# Patient Record
Sex: FEMALE | Race: BLACK OR AFRICAN AMERICAN | NOT HISPANIC OR LATINO | Employment: FULL TIME | URBAN - METROPOLITAN AREA
[De-identification: names, ages, dates, MRNs, and addresses within clinical notes are randomized per-mention and may not be internally consistent; named-entity substitution may affect disease eponyms.]

---

## 2019-01-17 ENCOUNTER — TRANSCRIBE ORDERS (OUTPATIENT)
Dept: REGISTRATION | Facility: HOSPITAL | Age: 34
End: 2019-01-17

## 2019-01-17 ENCOUNTER — HOSPITAL ENCOUNTER (OUTPATIENT)
Dept: RADIOLOGY | Facility: HOSPITAL | Age: 34
Discharge: HOME | End: 2019-01-17
Attending: OBSTETRICS & GYNECOLOGY | Admitting: RADIOLOGY
Payer: COMMERCIAL

## 2019-01-17 VITALS
RESPIRATION RATE: 18 BRPM | DIASTOLIC BLOOD PRESSURE: 69 MMHG | OXYGEN SATURATION: 100 % | SYSTOLIC BLOOD PRESSURE: 154 MMHG | HEART RATE: 92 BPM

## 2019-01-17 DIAGNOSIS — N97.8 ANTISPERM ANTIBODIES CAUSING INFERTILITY: ICD-10-CM

## 2019-01-17 PROCEDURE — 27200000 HC STERILE SUPPLY

## 2019-01-17 PROCEDURE — BU121ZZ FLUOROSCOPY OF BILATERAL FALLOPIAN TUBES USING LOW OSMOLAR CONTRAST: ICD-10-PCS | Performed by: RADIOLOGY

## 2019-01-17 PROCEDURE — 63600105 HC IODINE BASED CONTRAST: Performed by: OBSTETRICS & GYNECOLOGY

## 2019-01-17 PROCEDURE — 36100330 HC PROCEDURE ROOM 30MIN

## 2019-01-17 PROCEDURE — 74740 X-RAY FEMALE GENITAL TRACT: CPT

## 2019-01-17 RX ORDER — DULOXETIN HYDROCHLORIDE 60 MG/1
60 CAPSULE, DELAYED RELEASE ORAL DAILY
Status: ON HOLD | COMMUNITY
End: 2019-04-25

## 2019-01-17 RX ORDER — AZITHROMYCIN 250 MG/1
1000 TABLET, FILM COATED ORAL ONCE
Status: DISCONTINUED | OUTPATIENT
Start: 2019-01-17 | End: 2019-01-17 | Stop reason: HOSPADM

## 2019-01-17 RX ADMIN — IOHEXOL 18 ML: 300 INJECTION, SOLUTION INTRAVENOUS at 15:33

## 2019-01-17 NOTE — POST-PROCEDURE NOTE
Interventional Radiology Brief Postprocedure Note    Maria Michelle     Attending: Davian Minaya MD    Assistant: GEE Stauffer    Diagnosis: infertility    Description of procedure: hysterosalpingogram    Contrast: 18ml omni 300     Anesthesia:  none    Medications: none     Complications: None      Estimated Blood Loss: Estimated Blood Loss: None    Anticoagulation: not applicable    Specimens: none    Findings: patent left fallopean tube, proximal occlusion of right fallopean tube    1/17/2019 3:34 PM

## 2019-01-17 NOTE — DISCHARGE INSTRUCTIONS
- You may resume normal activity as tolerated  - You may resume normal diet  - Vagina spotting is common following the procedure, don't be concerned if it happens.  - If you develop heavy bleeding (like a period) please call [facility].

## 2019-04-25 ENCOUNTER — HOSPITAL ENCOUNTER (OUTPATIENT)
Dept: RADIOLOGY | Facility: HOSPITAL | Age: 34
Discharge: HOME | End: 2019-04-25
Attending: OBSTETRICS & GYNECOLOGY | Admitting: RADIOLOGY
Payer: COMMERCIAL

## 2019-04-25 ENCOUNTER — TRANSCRIBE ORDERS (OUTPATIENT)
Dept: REGISTRATION | Facility: HOSPITAL | Age: 34
End: 2019-04-25

## 2019-04-25 VITALS
DIASTOLIC BLOOD PRESSURE: 94 MMHG | RESPIRATION RATE: 20 BRPM | OXYGEN SATURATION: 99 % | SYSTOLIC BLOOD PRESSURE: 153 MMHG | HEART RATE: 92 BPM

## 2019-04-25 DIAGNOSIS — Z31.41 ENCOUNTER FOR FERTILITY TESTING: ICD-10-CM

## 2019-04-25 DIAGNOSIS — N97.8 ANTISPERM ANTIBODIES CAUSING INFERTILITY: ICD-10-CM

## 2019-04-25 PROCEDURE — 63600105 HC IODINE BASED CONTRAST: Performed by: OBSTETRICS & GYNECOLOGY

## 2019-04-25 PROCEDURE — 74740 X-RAY FEMALE GENITAL TRACT: CPT

## 2019-04-25 PROCEDURE — BU121ZZ FLUOROSCOPY OF BILATERAL FALLOPIAN TUBES USING LOW OSMOLAR CONTRAST: ICD-10-PCS | Performed by: RADIOLOGY

## 2019-04-25 RX ADMIN — IOHEXOL 45 ML: 300 INJECTION, SOLUTION INTRAVENOUS at 13:54

## 2019-04-25 NOTE — H&P
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 female who presents with infertility.     Medical History: No past medical history on file.    Surgical History: No past surgical history on file.    Allergies: Minocycline    [unfilled]    Social History:   Social History     Social History   • Marital status:      Spouse name: N/A   • Number of children: N/A   • Years of education: N/A     Social History Main Topics   • Smoking status: Not on file   • Smokeless tobacco: Not on file   • Alcohol use Not on file   • Drug use: Unknown   • Sexual activity: Not on file     Other Topics Concern   • Not on file     Social History Narrative   • No narrative on file       Family History: No family history on file.    Review of Systems  All other systems reviewed and negative except as noted in the HPI.    Objective     Vital Signs for the last 24 hours:  BP: ()/()   Arterial Line BP: ()/()       General appearance: alert, appears stated age, no distress and cooperative    Labs   I have reviewed the patient's pertinent labs. Pertinent labs are within normal limits.    Imaging  Not applicable    ECG/Telemetry  not applicable    Assessment/Plan     Continue with Hysterosalpingogram    VTE Assessment: Padua      Code Status: No Order  Estimated discharge date:       GEE Stauffer

## 2019-04-25 NOTE — DISCHARGE INSTRUCTIONS
- You may resume normal activity as tolerated  - You may resume normal diet  - Vagina spotting is common following the procedure, don't be concerned if it happens.  - If you develop heavy bleeding (like a period) please call [facility].

## 2021-06-02 ENCOUNTER — HOSPITAL ENCOUNTER (EMERGENCY)
Facility: HOSPITAL | Age: 36
Discharge: HOME | End: 2021-06-02
Attending: STUDENT IN AN ORGANIZED HEALTH CARE EDUCATION/TRAINING PROGRAM | Admitting: STUDENT IN AN ORGANIZED HEALTH CARE EDUCATION/TRAINING PROGRAM
Payer: COMMERCIAL

## 2021-06-02 VITALS
SYSTOLIC BLOOD PRESSURE: 134 MMHG | WEIGHT: 199 LBS | OXYGEN SATURATION: 98 % | BODY MASS INDEX: 35.26 KG/M2 | TEMPERATURE: 98 F | DIASTOLIC BLOOD PRESSURE: 75 MMHG | HEIGHT: 63 IN | HEART RATE: 82 BPM | RESPIRATION RATE: 18 BRPM

## 2021-06-02 DIAGNOSIS — N93.9 EPISODE OF HEAVY VAGINAL BLEEDING: Primary | ICD-10-CM

## 2021-06-02 LAB
ABO + RH BLD: NORMAL
ANION GAP SERPL CALC-SCNC: 5 MEQ/L (ref 3–15)
BASOPHILS # BLD: 0.02 K/UL (ref 0.01–0.1)
BASOPHILS NFR BLD: 0.4 %
BLD GP AB SCN SERPL QL: NEGATIVE
BUN SERPL-MCNC: 18 MG/DL (ref 8–20)
CALCIUM SERPL-MCNC: 9.2 MG/DL (ref 8.9–10.3)
CHLORIDE SERPL-SCNC: 107 MEQ/L (ref 98–109)
CO2 SERPL-SCNC: 24 MEQ/L (ref 22–32)
CREAT SERPL-MCNC: 0.9 MG/DL (ref 0.6–1.1)
D AG BLD QL: POSITIVE
DIFFERENTIAL METHOD BLD: ABNORMAL
EOSINOPHIL # BLD: 0.05 K/UL (ref 0.04–0.36)
EOSINOPHIL NFR BLD: 1 %
ERYTHROCYTE [DISTWIDTH] IN BLOOD BY AUTOMATED COUNT: 12.8 % (ref 11.7–14.4)
GFR SERPL CREATININE-BSD FRML MDRD: >60 ML/MIN/1.73M*2
GLUCOSE SERPL-MCNC: 94 MG/DL (ref 70–99)
HCG UR QL: NEGATIVE
HCT VFR BLDCO AUTO: 39.9 % (ref 35–45)
HGB BLD-MCNC: 12.9 G/DL (ref 11.8–15.7)
IMM GRANULOCYTES # BLD AUTO: 0.01 K/UL (ref 0–0.08)
IMM GRANULOCYTES NFR BLD AUTO: 0.2 %
LABORATORY COMMENT REPORT: NORMAL
LYMPHOCYTES # BLD: 1.65 K/UL (ref 1.2–3.5)
LYMPHOCYTES NFR BLD: 32.2 %
MCH RBC QN AUTO: 27.6 PG (ref 28–33.2)
MCHC RBC AUTO-ENTMCNC: 32.3 G/DL (ref 32.2–35.5)
MCV RBC AUTO: 85.3 FL (ref 83–98)
MONOCYTES # BLD: 0.29 K/UL (ref 0.28–0.8)
MONOCYTES NFR BLD: 5.7 %
NEUTROPHILS # BLD: 3.11 K/UL (ref 1.7–7)
NEUTS SEG NFR BLD: 60.5 %
NRBC BLD-RTO: 0 %
PDW BLD AUTO: 9.7 FL (ref 9.4–12.3)
PLATELET # BLD AUTO: 339 K/UL (ref 150–369)
POTASSIUM SERPL-SCNC: 3.9 MEQ/L (ref 3.6–5.1)
RBC # BLD AUTO: 4.68 M/UL (ref 3.93–5.22)
SODIUM SERPL-SCNC: 136 MEQ/L (ref 136–144)
WBC # BLD AUTO: 5.13 K/UL (ref 3.8–10.5)

## 2021-06-02 PROCEDURE — 84703 CHORIONIC GONADOTROPIN ASSAY: CPT | Performed by: PHYSICIAN ASSISTANT

## 2021-06-02 PROCEDURE — 96360 HYDRATION IV INFUSION INIT: CPT

## 2021-06-02 PROCEDURE — 85025 COMPLETE CBC W/AUTO DIFF WBC: CPT | Performed by: PHYSICIAN ASSISTANT

## 2021-06-02 PROCEDURE — 25800000 HC PHARMACY IV SOLUTIONS: Performed by: PHYSICIAN ASSISTANT

## 2021-06-02 PROCEDURE — 99284 EMERGENCY DEPT VISIT MOD MDM: CPT | Mod: 25

## 2021-06-02 PROCEDURE — 80048 BASIC METABOLIC PNL TOTAL CA: CPT | Performed by: PHYSICIAN ASSISTANT

## 2021-06-02 PROCEDURE — 36415 COLL VENOUS BLD VENIPUNCTURE: CPT | Performed by: PHYSICIAN ASSISTANT

## 2021-06-02 PROCEDURE — 3E0337Z INTRODUCTION OF ELECTROLYTIC AND WATER BALANCE SUBSTANCE INTO PERIPHERAL VEIN, PERCUTANEOUS APPROACH: ICD-10-PCS | Performed by: STUDENT IN AN ORGANIZED HEALTH CARE EDUCATION/TRAINING PROGRAM

## 2021-06-02 PROCEDURE — 86850 RBC ANTIBODY SCREEN: CPT

## 2021-06-02 RX ORDER — LEVOTHYROXINE SODIUM 50 UG/1
50 TABLET ORAL DAILY
COMMUNITY
Start: 2021-05-27

## 2021-06-02 RX ORDER — LEUPROLIDE ACETATE 1 MG/0.2ML
VIAL (ML) SUBCUTANEOUS
COMMUNITY
Start: 2021-03-17 | End: 2022-10-25 | Stop reason: ALTCHOICE

## 2021-06-02 RX ADMIN — SODIUM CHLORIDE 1000 ML: 9 INJECTION, SOLUTION INTRAVENOUS at 09:38

## 2021-06-02 ASSESSMENT — ENCOUNTER SYMPTOMS
VOMITING: 0
DIZZINESS: 0
LIGHT-HEADEDNESS: 1
NAUSEA: 0
CHILLS: 0
SHORTNESS OF BREATH: 1
WEAKNESS: 1
FEVER: 0
BACK PAIN: 0
COLOR CHANGE: 0
CHEST TIGHTNESS: 0
BRUISES/BLEEDS EASILY: 0
ABDOMINAL PAIN: 1

## 2021-06-02 NOTE — ED PROVIDER NOTES
Emergency Medicine Note  HPI   HISTORY OF PRESENT ILLNESS     36-year-old female history of anemia, hypothyroidism, PCOS presents the emergency room for evaluation of lightheadedness and vaginal bleeding.  Patient is undergoing treatment by her endocrinologist for infertility, she was on birth control pills until 5/7/2021.  She reports mild but constant vaginal bleeding that began on 5/11/2021 while on the birth control pills, these were discontinued 1 week ago per her endocrinologist and she reports worsening vaginal bleeding.  She is saturating 1 overnight sanitary pad every 3 hours, also reports generalized weakness and lightheadedness with occasional dyspnea on exertion.  She does have a history of anemia but has never required blood transfusions. She underwent endometrial biopsy 1-2 months ago. She is followed by an OB/GYN at [facility].  Denies fever, chills, abdominal pain or low back pain.      Vaginal Bleeding  Associated symptoms: abdominal pain (intermittent cramping, none currently)    Associated symptoms: no back pain, no dizziness, no fever and no nausea          Patient History   PAST HISTORY     Reviewed from Nursing Triage:      Past Medical History:   Diagnosis Date   • Anemia    • Hypothyroidism    • PCOS (polycystic ovarian syndrome)        Past Surgical History:   Procedure Laterality Date   • POLYPECTOMY         History reviewed. No pertinent family history.    Social History     Tobacco Use   • Smoking status: Never Smoker   • Smokeless tobacco: Never Used   Vaping Use   • Vaping Use: Never used   Substance Use Topics   • Alcohol use: Yes     Comment: occassionally   • Drug use: Not Currently         Review of Systems   REVIEW OF SYSTEMS     Review of Systems   Constitutional: Negative for chills and fever.   Respiratory: Positive for shortness of breath. Negative for chest tightness.    Cardiovascular: Negative for chest pain.   Gastrointestinal: Positive for abdominal pain (intermittent  cramping, none currently). Negative for nausea and vomiting.   Genitourinary: Positive for vaginal bleeding. Negative for vaginal pain.   Musculoskeletal: Negative for back pain.   Skin: Negative for color change and pallor.   Allergic/Immunologic: Negative for immunocompromised state.   Neurological: Positive for weakness and light-headedness. Negative for dizziness and syncope.   Hematological: Does not bruise/bleed easily.   All other systems reviewed and are negative.        VITALS     ED Vitals    Date/Time Temp Pulse Resp BP SpO2 [facility]   06/02/21 0902 36.7 °C (98 °F) 93 17 144/82 97 % AA        Pulse Ox %: 97 % (06/02/21 0902)  Pulse Ox Interpretation: Normal (06/02/21 0902)  Heart Rate: 93 (06/02/21 0902)  Rhythm Strip Interpretation: Normal Sinus Rhythm (06/02/21 0902)     Physical Exam   PHYSICAL EXAM     Physical Exam  Vitals and nursing note reviewed.   Constitutional:       General: She is not in acute distress.     Appearance: Normal appearance. She is not ill-appearing or toxic-appearing.   HENT:      Head: Normocephalic and atraumatic.   Cardiovascular:      Rate and Rhythm: Normal rate and regular rhythm.      Pulses: Normal pulses.      Heart sounds: Normal heart sounds.   Pulmonary:      Effort: Pulmonary effort is normal.      Breath sounds: Normal breath sounds.   Abdominal:      General: Abdomen is flat. Bowel sounds are normal.      Palpations: Abdomen is soft.      Tenderness: There is no abdominal tenderness.   Musculoskeletal:         General: Normal range of motion.   Skin:     General: Skin is warm and dry.   Neurological:      Mental Status: She is alert.           PROCEDURES     Procedures     DATA     Results     Procedure Component Value Units Date/Time    Type and screen [[id number]] Collected: 06/02/21 0915    Specimen: Blood, Venous Updated: 06/02/21 1011     ABO A     Rh Factor Positive     History Check No type on file    Basic metabolic panel [[id number]]  (Normal) Collected: 06/02/21  0915    Specimen: Blood, Venous Updated: 06/02/21 0942     Sodium 136 mEQ/L      Potassium 3.9 mEQ/L      Comment: Results obtained on plasma. Plasma Potassium values may be up to 0.4 mEQ/L less than serum values. The differences may be greater for patients with high platelet or white cell counts.        Chloride 107 mEQ/L      CO2 24 mEQ/L      BUN 18 mg/dL      Creatinine 0.9 mg/dL      Glucose 94 mg/dL      Calcium 9.2 mg/dL      eGFR >60.0 mL/min/1.73m*2      Anion Gap 5 mEQ/L     BhCG, Serum, Qual [[id number]]  (Normal) Collected: 06/02/21 0915    Specimen: Blood, Venous Updated: 06/02/21 0936     Preg Test, Serum Negative    CBC and differential [[id number]]  (Abnormal) Collected: 06/02/21 0915    Specimen: Blood, Venous Updated: 06/02/21 0926     WBC 5.13 K/uL      RBC 4.68 M/uL      Hemoglobin 12.9 g/dL      Hematocrit 39.9 %      MCV 85.3 fL      MCH 27.6 pg      MCHC 32.3 g/dL      RDW 12.8 %      Platelets 339 K/uL      MPV 9.7 fL      Differential Type Auto     nRBC 0.0 %      Immature Granulocytes 0.2 %      Neutrophils 60.5 %      Lymphocytes 32.2 %      Monocytes 5.7 %      Eosinophils 1.0 %      Basophils 0.4 %      Immature Granulocytes, Absolute 0.01 K/uL      Neutrophils, Absolute 3.11 K/uL      Lymphocytes, Absolute 1.65 K/uL      Monocytes, Absolute 0.29 K/uL      Eosinophils, Absolute 0.05 K/uL      Basophils, Absolute 0.02 K/uL           Imaging Results    None         No orders to display       Scoring tools                                 ED Course & MDM   MDM / ED COURSE and CLINICAL IMPRESSIONS     MDM    ED Course as of Jun 02 1018 Wed Jun 02, 2021   0928 Hemoglobin: 12.9 [EK]   0934 Pt seen and evaluated. Appears generally well. Reports scant vaginal bleeding since mid May but increased in amount the last few days. Had scheduled f/u apt this morning but was advised to come to ED first for blood work secondary to described amount of bleeding and symptoms. Upon exam pt appears very well and  articulate with smart phone as she continues to work in [employer]. Will await labs and if unremarkable dc home with continued f/u as scheduled. Q/C addressed. Agree with plan.     [NS]   1015 Patient's blood work is all normal, she is very well-appearing with normal vital signs and reports feeling better after IV fluids. She has not had to change her pad since she arrived in the ED. She stable for discharge home with follow-up with her endocrinologist to continue fertility treatment.  She is to return to the emergency room for any symptoms of anemia or severe vaginal bleeding.    [EK]      ED Course User Index  [EK] Lillian Bell PA C  [NS] Duglas Heard, DO         Clinical Impressions as of Jun 02 1018   Episode of heavy vaginal bleeding            Lillian Bell PA C  06/02/21 1018

## 2021-06-02 NOTE — DISCHARGE INSTRUCTIONS
Follow up with your endocrinologist within 2 days of discharge from the ED.    Drink plenty of fluids.    Return to the emergency room if you experience worsening symptoms including severe vaginal bleeding (saturating > 1 pad per hour), dizziness, shortness of breath, severe abdominal pain, fainting or any other concerning symptoms.

## 2021-06-02 NOTE — Clinical Note
Maria Michelle was seen and treated in our emergency department on 6/2/2021.  She may return to work on 06/02/2021.       If you have any questions or concerns, please don't hesitate to call.      Lillian Bell PA C

## 2021-06-02 NOTE — ED ATTESTATION NOTE
"I saw and evaluated the patient, participated in the management, and agree with the findings in the above note. We discussed the case and the treatment plan.  Exam:  Patient Vitals for the past 72 hrs:   BP Temp Temp src Pulse Resp SpO2 Height Weight   06/02/21 0902 (!) 144/82 36.7 °C (98 °F) Tympanic 93 17 97 % 1.6 m (5' 3\") 90.3 kg (199 lb)   vss, nad, nontoxic, head at/nc, normal speech, no resp distress, normal skin tone, lungs ctab, cardiac rrr without m/r/g, coordinated movement.        Duglas Heard DO  06/02/21 1004    "

## 2022-10-25 ENCOUNTER — HOSPITAL ENCOUNTER (EMERGENCY)
Facility: HOSPITAL | Age: 37
Discharge: HOME | End: 2022-10-25
Attending: EMERGENCY MEDICINE
Payer: COMMERCIAL

## 2022-10-25 VITALS
OXYGEN SATURATION: 99 % | DIASTOLIC BLOOD PRESSURE: 89 MMHG | HEART RATE: 84 BPM | RESPIRATION RATE: 18 BRPM | SYSTOLIC BLOOD PRESSURE: 131 MMHG | TEMPERATURE: 97 F

## 2022-10-25 DIAGNOSIS — N76.0 BACTERIAL VAGINOSIS: Primary | ICD-10-CM

## 2022-10-25 DIAGNOSIS — B96.89 BACTERIAL VAGINOSIS: Primary | ICD-10-CM

## 2022-10-25 LAB
B-HCG UR QL: NEGATIVE
BACTERIA URNS QL MICRO: ABNORMAL /HPF
BILIRUB UR QL STRIP.AUTO: NEGATIVE MG/DL
BV WHIFF TEST VAG QL: ABNORMAL
CLARITY UR REFRACT.AUTO: CLEAR
CLUE CELLS SPEC QL WET PREP: ABNORMAL
COLOR UR AUTO: YELLOW
GLUCOSE UR STRIP.AUTO-MCNC: NEGATIVE MG/DL
HGB UR QL STRIP.AUTO: NEGATIVE
HYALINE CASTS #/AREA URNS LPF: ABNORMAL /LPF
KETONES UR STRIP.AUTO-MCNC: NEGATIVE MG/DL
LEUKOCYTE ESTERASE UR QL STRIP.AUTO: 1
MUCOUS THREADS URNS QL MICRO: ABNORMAL /LPF
NITRITE UR QL STRIP.AUTO: NEGATIVE
PH UR STRIP.AUTO: 7 [PH]
PROT UR QL STRIP.AUTO: ABNORMAL
RBC #/AREA URNS HPF: ABNORMAL /HPF
SP GR UR REFRACT.AUTO: 1.02
SQUAMOUS URNS QL MICRO: ABNORMAL /HPF
T VAGINALIS GENITAL QL WET PREP: ABNORMAL
UROBILINOGEN UR STRIP-ACNC: 0.2 EU/DL
WBC #/AREA URNS HPF: ABNORMAL /HPF
WBC VAG QL WET PREP: ABNORMAL
YEAST VAG QL WET PREP: ABNORMAL

## 2022-10-25 PROCEDURE — 81003 URINALYSIS AUTO W/O SCOPE: CPT

## 2022-10-25 PROCEDURE — 87086 URINE CULTURE/COLONY COUNT: CPT | Performed by: EMERGENCY MEDICINE

## 2022-10-25 PROCEDURE — 99284 EMERGENCY DEPT VISIT MOD MDM: CPT

## 2022-10-25 PROCEDURE — 99283 EMERGENCY DEPT VISIT LOW MDM: CPT

## 2022-10-25 PROCEDURE — 87086 URINE CULTURE/COLONY COUNT: CPT

## 2022-10-25 PROCEDURE — 81001 URINALYSIS AUTO W/SCOPE: CPT | Performed by: EMERGENCY MEDICINE

## 2022-10-25 PROCEDURE — 87591 N.GONORRHOEAE DNA AMP PROB: CPT | Performed by: PHYSICIAN ASSISTANT

## 2022-10-25 PROCEDURE — 81025 URINE PREGNANCY TEST: CPT | Performed by: PHYSICIAN ASSISTANT

## 2022-10-25 PROCEDURE — 63700000 HC SELF-ADMINISTRABLE DRUG: Performed by: PHYSICIAN ASSISTANT

## 2022-10-25 PROCEDURE — 87210 SMEAR WET MOUNT SALINE/INK: CPT | Performed by: PHYSICIAN ASSISTANT

## 2022-10-25 RX ORDER — SPIRONOLACTONE 50 MG/1
50 TABLET, FILM COATED ORAL
COMMUNITY
Start: 2022-09-07

## 2022-10-25 RX ORDER — DULOXETIN HYDROCHLORIDE 60 MG/1
CAPSULE, DELAYED RELEASE ORAL
COMMUNITY
Start: 2022-08-19

## 2022-10-25 RX ORDER — METRONIDAZOLE 500 MG/1
500 TABLET ORAL 2 TIMES DAILY
Qty: 14 TABLET | Refills: 0 | Status: SHIPPED | OUTPATIENT
Start: 2022-10-25 | End: 2022-11-01

## 2022-10-25 RX ORDER — SEMAGLUTIDE 1.34 MG/ML
INJECTION, SOLUTION SUBCUTANEOUS
COMMUNITY
Start: 2022-10-17

## 2022-10-25 RX ORDER — METRONIDAZOLE 500 MG/1
500 TABLET ORAL ONCE
Status: COMPLETED | OUTPATIENT
Start: 2022-10-25 | End: 2022-10-25

## 2022-10-25 RX ADMIN — METRONIDAZOLE 500 MG: 500 TABLET ORAL at 19:50

## 2022-10-25 ASSESSMENT — ENCOUNTER SYMPTOMS
VOMITING: 0
HEMATURIA: 0
EYE PAIN: 0
COUGH: 0
FEVER: 0
ABDOMINAL PAIN: 0
CHILLS: 0
AGITATION: 0
ARTHRALGIAS: 0
BACK PAIN: 0
SEIZURES: 0
SHORTNESS OF BREATH: 0
PALPITATIONS: 0
DIFFICULTY URINATING: 0
CONFUSION: 0
SORE THROAT: 0
NAUSEA: 0
DYSURIA: 0
COLOR CHANGE: 0

## 2022-10-25 NOTE — ED PROVIDER NOTES
Emergency Medicine Note  HPI   HISTORY OF PRESENT ILLNESS       History provided by:  Patient   used: No       37 y.o. female with PMH of anemia, hypothyroidism, and PCOS presents to ED for evaluation of vaginal complaints. Pt had Liletta IUD placed in April 2022. Since then she has been having spotting which her GYN did warn her about. She does not check for IUD strings at home. Pt noticed a fishy vaginal odor a few weeks ago and is concerned for infection. She has had 1 new sexual partner 3 weeks ago. Denies history of STIs for herself. Her partner has a known history of genital herpes and is on antivirals and is currently asymptomatic. Denies genital sores, pelvic pain, urinary symptoms, vaginal pain or discharge, abdominal pain, nausea, vomiting, fevers, chills.         Patient History   PAST HISTORY     Reviewed from Nursing Triage:  Tobacco  Allergies  Meds  Problems  Med Hx  Surg Hx  Fam Hx  Soc   Hx      Past Medical History:   Diagnosis Date    Anemia     Hypothyroidism     PCOS (polycystic ovarian syndrome)        Past Surgical History:   Procedure Laterality Date    POLYPECTOMY         History reviewed. No pertinent family history.    Social History     Tobacco Use    Smoking status: Never    Smokeless tobacco: Never   Vaping Use    Vaping Use: Never used   Substance Use Topics    Alcohol use: Yes     Comment: occassionally    Drug use: Not Currently         Review of Systems   REVIEW OF SYSTEMS     Review of Systems   Constitutional: Negative for chills and fever.   HENT: Negative for ear pain and sore throat.    Eyes: Negative for pain and visual disturbance.   Respiratory: Negative for cough and shortness of breath.    Cardiovascular: Negative for chest pain and palpitations.   Gastrointestinal: Negative for abdominal pain, nausea and vomiting.   Endocrine: Negative for polyuria.   Genitourinary: Positive for vaginal bleeding and vaginal discharge. Negative for  difficulty urinating, dysuria, genital sores, hematuria, pelvic pain and vaginal pain.        (+) fishy vaginal odor   Musculoskeletal: Negative for arthralgias and back pain.   Skin: Negative for color change and rash.   Neurological: Negative for seizures and syncope.   Psychiatric/Behavioral: Negative for agitation and confusion.         VITALS     ED Vitals    Date/Time Temp Pulse Resp BP SpO2 [facility]   10/25/22 1831 -- 84 18 131/89 99 % RP   10/25/22 1648 36.1 °C (97 °F) 105 17 131/87 97 % HC        Pulse Ox %: 99 % (10/25/22 1834)  Pulse Ox Interpretation: Normal (10/25/22 1834)           Physical Exam   PHYSICAL EXAM     Physical Exam  Vitals and nursing note reviewed. Exam conducted with a chaperone present.   Constitutional:       General: She is not in acute distress.     Appearance: She is well-developed.   HENT:      Head: Normocephalic and atraumatic.   Cardiovascular:      Rate and Rhythm: Normal rate and regular rhythm.      Heart sounds: Normal heart sounds.   Pulmonary:      Effort: Pulmonary effort is normal.      Breath sounds: Normal breath sounds.   Abdominal:      Palpations: Abdomen is soft.      Tenderness: There is no abdominal tenderness.   Genitourinary:     Comments: Chaperone present: Citlali Saxena (scribe)   Scant amount of bloody discharge. No foul odor. No adnexal mass or tenderness. Cervix appears normal. IUD strings in normal position.   Skin:     General: Skin is warm and dry.   Neurological:      Mental Status: She is alert and oriented to person, place, and time.           PROCEDURES     Procedures     DATA     Results     Procedure Component Value Units Date/Time    Wet prep, genital Vagina [[id number]]  (Abnormal) Collected: 10/25/22 1855    Specimen: Vaginal Swab Updated: 10/25/22 1929     WBC, Wet Prep 1+     Clue Cells, Wet Prep 1+     Yeast, Wet Prep None Seen     Trich, Wet Prep None Seen     Whiff Test Fishy odor detected when specimen mixed with KOH     Comment: A Wet Prep  test should not be used as the sole method for detection of bacterial vaginosis.        Urine, pregnancy (lab) [[id number]]  (Normal) Collected: 10/25/22 1854    Specimen: Urine, Clean Catch Updated: 10/25/22 1907     BHCG, Qualitative Urine Negative    Chlamydia/GC RNA:ThinPrep/Urine/Swab Cervical [[id number]] Collected: 10/25/22 1854    Specimen: Swab from Cervical Updated: 10/25/22 1859    Urinalysis with Reflex Culture [[id number]]  (Abnormal) Collected: 10/25/22 1652    Specimen: Urine, Clean Catch Updated: 10/25/22 1701    Narrative:      The following orders were created for panel order Urinalysis with Reflex Culture.  Procedure                               Abnormality         Status                     ---------                               -----------         ------                     UA Reflex to Culture (Ma...[[id number]]  Abnormal            Final result               UA Microscopic[[id number]]               Abnormal            Final result                 Please view results for these tests on the individual orders.    UA Microscopic [[id number]]  (Abnormal) Collected: 10/25/22 1652    Specimen: Urine, Clean Catch Updated: 10/25/22 1701     RBC, Urine 0 TO 4 /HPF      WBC, Urine 4 TO 10 /HPF      Squamous Epithelial Rare /hpf      Hyaline Cast None Seen /lpf      Bacteria, Urine None Seen /HPF      MUCUSUA Rare /LPF     UA Reflex to Culture (Macroscopic) [[id number]]  (Abnormal) Collected: 10/25/22 1652    Specimen: Urine, Clean Catch Updated: 10/25/22 1659     Color, Urine Yellow     Clarity, Urine Clear     Specific Gravity, Urine 1.025     pH, Urine 7.0     Leukocyte Esterase +1     Nitrite, Urine Negative     Protein, Urine Trace     Comment: Trace False Positive Protein can be seen with alkaline or highly buffered urines or urine with high specific gravity. Suggest clinical correlation.        Glucose, Urine Negative mg/dL      Ketones, Urine Negative mg/dL      Urobilinogen, Urine 0.2 EU/dL       Bilirubin, Urine Negative mg/dL      Blood, Urine Negative     Comment: The sensitivity of the occult blood test is equivalent to approximately 4 intact RBC/HPF.             Imaging Results    None         No orders to display       Scoring tools                                  ED Course & MDM   MDM / ED COURSE / CLINICAL IMPRESSION / DISPO     MDM    ED Course as of 10/25/22 2314   Tue Oct 25, 2022   1924 Preg Test, Ur: Negative [SL]   1943 WBC, Wet Prep(!): 1+ [SL]   1943 Clue Cells, Wet Prep(!): 1+ [SL]   1943 Whiff Test(!): Fishy odor detected when specimen mixed with KOH [SL]   1943 Re-evaluation performed. Patient exam unchanged. Patient resting comfortably.  Discussed results and plan with patient.  Patient verbalized understanding and agreeable without any questions or concerns.      Education provided on signs and symptoms that would warrant a return visit to the emergency department along with emphasis on importance of follow-up.  Patient verbalized understanding and agreeable.  All questions answered.    The care provided today was provided during a global pandemic with the hospital under severely high patient volume status with limited resources available.    [SL]      ED Course User Index  [SL] Christy Canales PA C     Clinical Impression      Bacterial vaginosis     _________________     ED Disposition   Discharge              By signing my name below, Citlali CHAU, attest that this documentation has been prepared under the direction and in the presence of LOU Canales PA-C.    10/25/2022 11:18 PM      Christy CHAU, personally performed the services described in this documentation, as documented by the scribe in my presence, and it is both accurate and complete.  10/25/2022 11:18 PM         Citlali Saxena  10/25/22 1832       Christy Canales PA C  10/25/22 [id number]

## 2022-10-25 NOTE — DISCHARGE INSTRUCTIONS
Please take the antibiotic as prescribed. Do not drink alcohol.    Follow up with your gynecologist. Return for fevers or pelvic pain.     Return to the ED for worsening of symptoms or any problems or concerns.  It is very important to follow up with your healthcare provider for re-evaluation.    You may have incidental findings on your lab work or radiology studies today. Please be sure to discuss your results with your primary care doctor. They may follow and perform additional testing/imaging if needed.

## 2022-10-26 LAB
C TRACH RRNA SPEC QL NAA+PROBE: NEGATIVE
N GONORRHOEA RRNA SPEC QL NAA+PROBE: NEGATIVE

## 2022-10-27 LAB — BACTERIA UR CULT: NORMAL

## 2022-10-27 NOTE — ED ATTESTATION NOTE
The patient was evaluated and managed by the physician assistant / nurse practitioner.    I agree with the evaluation, treatment, and plan of care provided by the physician assistant/nurse practitioner.  I also agree with the documentation provided.    I was available to discuss and review all aspects of the history and physical examination, and I was available to discuss and review any lab work or imaging done for this patient in the emergency department.    I reviewed the vital signs.  The pulse ox was: 97% on room air, normal     Pedro Pablo Solo,   10/26/22 2257

## 2023-06-19 NOTE — POST-PROCEDURE NOTE
Interventional Radiology Brief Postprocedure Note    Maria Michelle     Attending: Davian Minaya MD    Assistant: GEE Stauffer    Diagnosis: Infertility    Description of procedure: Hysterosalpingogram    Contrast: 45ml omni 300     Anesthesia:  None    Medications: none     Complications: None      Estimated Blood Loss: Estimated Blood Loss: None    Anticoagulation: not applicable    Specimens: none    Findings: Normal hysterosalpingogram    4/25/2019 1:52 PM     Detail Level: Detailed General Sunscreen Counseling: I recommended a broad spectrum sunscreen with a SPF of 30 or higher. I explained that SPF 30 sunscreens block approximately 97 percent of the sun's harmful rays. Sunscreens should be applied at least 15 minutes prior to expected sun exposure and then every 2 hours after that as long as sun exposure continues. If swimming or exercising sunscreen should be reapplied every 45 minutes to an hour after getting wet or sweating. One ounce, or the equivalent of a shot glass full of sunscreen, is adequate to protect the skin not covered by a bathing suit. I also recommended a lip balm with a sunscreen as well. Sun protective clothing can be used in lieu of sunscreen but must be worn the entire time you are exposed to the sun's rays.

## 2023-11-29 ENCOUNTER — HOSPITAL ENCOUNTER (EMERGENCY)
Facility: HOSPITAL | Age: 38
Discharge: HOME | End: 2023-11-29
Attending: EMERGENCY MEDICINE
Payer: COMMERCIAL

## 2023-11-29 VITALS
HEART RATE: 101 BPM | OXYGEN SATURATION: 100 % | HEIGHT: 63 IN | BODY MASS INDEX: 35.44 KG/M2 | TEMPERATURE: 98.4 F | RESPIRATION RATE: 18 BRPM | WEIGHT: 200 LBS

## 2023-11-29 DIAGNOSIS — B96.89 BACTERIAL VAGINOSIS: Primary | ICD-10-CM

## 2023-11-29 DIAGNOSIS — N76.0 BACTERIAL VAGINOSIS: Primary | ICD-10-CM

## 2023-11-29 PROCEDURE — 87491 CHLMYD TRACH DNA AMP PROBE: CPT | Performed by: PHYSICIAN ASSISTANT

## 2023-11-29 PROCEDURE — 87591 N.GONORRHOEAE DNA AMP PROB: CPT | Performed by: PHYSICIAN ASSISTANT

## 2023-11-29 PROCEDURE — 99281 EMR DPT VST MAYX REQ PHY/QHP: CPT

## 2023-11-29 RX ORDER — METRONIDAZOLE 500 MG/1
500 TABLET ORAL 2 TIMES DAILY
Qty: 14 TABLET | Refills: 0 | Status: SHIPPED | OUTPATIENT
Start: 2023-11-29 | End: 2023-12-06

## 2023-11-29 ASSESSMENT — ENCOUNTER SYMPTOMS
FEVER: 0
HEADACHES: 1
VOMITING: 0
ABDOMINAL PAIN: 0

## 2023-11-29 NOTE — Clinical Note
Maria Michelle was seen and treated in our emergency department on 11/29/2023.  Maria Michelle may return to work on 11/30/2023.       If you have any questions or concerns, please don't hesitate to call.      Lizzie Madera PA

## 2023-11-29 NOTE — DISCHARGE INSTRUCTIONS
No evidence of tampon noted in your vagina.  Take antibiotics as prescribed.  Do not drink alcohol while taking the antibiotic as this may make you very sick.  Follow-up with your gynecologist for further evaluation.  Return to the emergency department for worsening symptoms.  Please follow-up for the results of your culture.

## 2023-11-29 NOTE — ED PROVIDER NOTES
Emergency Medicine Note  HPI   HISTORY OF PRESENT ILLNESS     38-year-old female presents to the emergency department concerned she may have a tampon in her vagina.  She states she was on the tail end of her period when she was on vacation in [place] last week.  She states she remembers putting a tampon in but cannot remember taking it out.  She states she has had sexual activity and she was drinking alcohol and then was trying to find the tampon but could not locate it.  She states she has a discharge that is malodorous.  She denies abdominal pain or vomiting.  She complains of a mild headache.  She has an IUD in place.      History provided by:  Patient   used: No    Foreign Body in Vagina  Location:  Vagina  Severity:  Mild  Onset quality:  Gradual  Duration:  1 week  Timing:  Constant  Progression:  Worsening  Chronicity:  New  Associated symptoms: headaches    Associated symptoms: no abdominal pain, no fever and no vomiting          Patient History   PAST HISTORY     Reviewed from Nursing Triage:       Past Medical History:   Diagnosis Date   • Anemia    • Hypothyroidism    • PCOS (polycystic ovarian syndrome)        Past Surgical History:   Procedure Laterality Date   • POLYPECTOMY         History reviewed. No pertinent family history.    Social History     Tobacco Use   • Smoking status: Never   • Smokeless tobacco: Never   Vaping Use   • Vaping Use: Never used   Substance Use Topics   • Alcohol use: Yes     Comment: occassionally   • Drug use: Not Currently         Review of Systems   REVIEW OF SYSTEMS     Review of Systems   Constitutional: Negative for fever.   Gastrointestinal: Negative for abdominal pain and vomiting.   Genitourinary: Positive for vaginal discharge. Negative for pelvic pain, vaginal bleeding and vaginal pain.   Neurological: Positive for headaches.   All other systems reviewed and are negative.        VITALS     ED Vitals    Date/Time Temp Pulse Resp BP SpO2 Who    11/29/23 1445 36.9 °C (98.4 °F) 101 18 -- 100 % SLS        Pulse Ox %: 100 % (11/29/23 1559)  Pulse Ox Interpretation: Normal (11/29/23 1559)           Physical Exam   PHYSICAL EXAM     Physical Exam  Vitals and nursing note reviewed. Exam conducted with a chaperone present.   Constitutional:       Appearance: Normal appearance.   HENT:      Head: Normocephalic and atraumatic.   Cardiovascular:      Rate and Rhythm: Normal rate and regular rhythm.      Pulses: Normal pulses.      Heart sounds: Normal heart sounds.   Pulmonary:      Effort: Pulmonary effort is normal.      Breath sounds: Normal breath sounds.   Abdominal:      General: Abdomen is flat. Bowel sounds are normal.      Palpations: Abdomen is soft.      Tenderness: There is no abdominal tenderness.   Genitourinary:     Exam position: Lithotomy position.      Vagina: No foreign body. Vaginal discharge present. No bleeding.      Cervix: Discharge present.      Comments: No evidence of vaginal foreign body.  Purulent discharge noted from the cervical os.  Skin:     Capillary Refill: Capillary refill takes less than 2 seconds.   Neurological:      General: No focal deficit present.      Mental Status: She is alert and oriented to person, place, and time.           PROCEDURES     Procedures     DATA     Results     Procedure Component Value Units Date/Time    Chlamydia/GC RNA:ThinPrep/Urine/Swab Cervical [[id number]] Collected: 11/29/23 1549    Specimen: Swab from Cervical Updated: 11/29/23 1552          Imaging Results    None         No orders to display       Scoring tools                                  ED Course & MDM   MDM / ED COURSE / CLINICAL IMPRESSION / DISPO     Medical Decision Making  Bacterial vaginosis: acute illness or injury  Amount and/or Complexity of Data Reviewed  Labs: ordered. Decision-making details documented in ED Course.  Discussion of management or test interpretation with external provider(s): 38-year-old female presents with  concern for retained tampon.  No evidence of tampon in the vagina however there is purulent discharge from the cervical os.  Will treat for bacterial vaginosis.  GC chlamydia cultures are pending.  Advised to follow-up with her gynecologist.  Return for worsening symptoms.    Risk  Prescription drug management.             Clinical Impression      Bacterial vaginosis     _________________     ED Disposition   Discharge                   Lizzie Madera PA  11/29/23 1600

## 2023-11-30 LAB
C TRACH RRNA SPEC QL NAA+PROBE: NEGATIVE
N GONORRHOEA RRNA SPEC QL NAA+PROBE: NEGATIVE

## 2023-11-30 NOTE — ED ATTESTATION NOTE
Procedures  Physical Exam  Review of Systems    11/30/20231:35 AM  I have personally seen and examined the patient.  I reviewed and agree with the PA/NP/Resident's assessment and plan of care.    My examination, assessment, and plan of care of Maria Michelle is as follows:  The patient presents with concerns for a retained tampon in her vagina.  She was on vacation recently in [place] and feels she may have a retained tampon.  She is complaining of a foul-smelling vaginal discharge.  She denies fevers or chills or abdominal discomfort.  Exam: The patient was alert in no acute distress.  I examined the patient's vagina with the physician assistant.  There was a brownish discharge in the cul-de-sac but no foreign body, no retained tampon.  Cultures were taken.  Impression/Plan: The patient was diagnosed with BV and discharged home on Flagyl.  She will follow-up with her GYN specialist.  She was given strict return precautions.    Vital Sign Review: Vital signs have been ordered and reviewed. The oxygen saturation is 100% on room air, normal     I was physically present for the key/critical portions of the following procedures: None    This document was created using dragon dictation software.  There might be some typographical errors due to this technology.     Pedro Pablo Solo, DO  11/30/23 0137

## 2024-04-24 DIAGNOSIS — Z00.6 ENCOUNTER FOR EXAMINATION FOR NORMAL COMPARISON OR CONTROL IN CLINICAL RESEARCH PROGRAM: ICD-10-CM

## 2024-04-27 ENCOUNTER — APPOINTMENT (OUTPATIENT)
Dept: LAB | Facility: CLINIC | Age: 39
End: 2024-04-27

## 2024-04-27 DIAGNOSIS — Z00.6 ENCOUNTER FOR EXAMINATION FOR NORMAL COMPARISON OR CONTROL IN CLINICAL RESEARCH PROGRAM: ICD-10-CM

## 2024-04-27 PROCEDURE — 36415 COLL VENOUS BLD VENIPUNCTURE: CPT

## 2024-05-17 LAB
APOB+LDLR+PCSK9 GENE MUT ANL BLD/T: NOT DETECTED
BRCA1+BRCA2 DEL+DUP + FULL MUT ANL BLD/T: NOT DETECTED
MLH1+MSH2+MSH6+PMS2 GN DEL+DUP+FUL M: NOT DETECTED